# Patient Record
Sex: MALE | Race: WHITE | URBAN - METROPOLITAN AREA
[De-identification: names, ages, dates, MRNs, and addresses within clinical notes are randomized per-mention and may not be internally consistent; named-entity substitution may affect disease eponyms.]

---

## 2020-10-28 ENCOUNTER — TELEPHONE ENCOUNTER (OUTPATIENT)
Dept: URBAN - METROPOLITAN AREA CLINIC 35 | Facility: CLINIC | Age: 50
End: 2020-10-28

## 2020-11-12 ENCOUNTER — OFFICE VISIT (OUTPATIENT)
Dept: URBAN - METROPOLITAN AREA CLINIC 35 | Facility: CLINIC | Age: 50
End: 2020-11-12

## 2020-11-12 NOTE — HPI-MIGRATED HPI
;     Acid Reflux : Patient is a 49-year-old male, who presents today for acid reflux.   Patient denies/admits dysphagia, globus, sour eructations, bloating/gas, indigestion, early satiety, changes in appetite, coughing, nausea, vomiting, abdominal/epigastric pain, or changes in bowel habits.  Patient states that they have/have not had any recent changes in their medications.  He never had EGD.  Last visit (07/02/2019)                 Patient presents today as a new patient to discuss recent onset (about a year and a half ago) of heartburn and reflux. Symptoms include "bumps on the back of his tongue, patient believes that these are inflamed taste buds). Patient states that his dentist saw the bumps and said she also believed they were related to acid reflux. Patient also states that he had an episode where the area in the epigastric region got very distended. He got the generic protonix 40 mg, BID. He took this for 30 days, then stopped. Patient states that his distention and bloating returned. Patient also notes some gas. Patient will occasionally have heartburn. Patient denies dysphagia, globus sensation, sour eructations, indigestion, abdominal pain, nausea, or changes in his/her bowel habits. Patient denies any changes in medications within the last three months. He is unsure of his family history because he is adopted. Patient has never had an EGD before. He has/has  never had a Barium Swallow Test before.  Early satiety has not been present.;

## 2020-11-17 ENCOUNTER — LAB OUTSIDE AN ENCOUNTER (OUTPATIENT)
Dept: URBAN - METROPOLITAN AREA CLINIC 78 | Facility: CLINIC | Age: 50
End: 2020-11-17

## 2020-11-17 ENCOUNTER — OFFICE VISIT (OUTPATIENT)
Dept: URBAN - METROPOLITAN AREA CLINIC 78 | Facility: CLINIC | Age: 50
End: 2020-11-17
Payer: COMMERCIAL

## 2020-11-17 DIAGNOSIS — R19.5 LOOSE BOWEL MOVEMENT: ICD-10-CM

## 2020-11-17 DIAGNOSIS — R14.0 BLOATING: ICD-10-CM

## 2020-11-17 PROCEDURE — 99204 OFFICE O/P NEW MOD 45 MIN: CPT | Performed by: INTERNAL MEDICINE

## 2020-11-17 PROCEDURE — G8482 FLU IMMUNIZE ORDER/ADMIN: HCPCS | Performed by: INTERNAL MEDICINE

## 2020-11-17 PROCEDURE — 99244 OFF/OP CNSLTJ NEW/EST MOD 40: CPT | Performed by: INTERNAL MEDICINE

## 2020-11-17 RX ORDER — ATORVASTATIN CALCIUM 10 MG/1
1 TABLET TABLET, FILM COATED ORAL ONCE A DAY
Status: ACTIVE | COMMUNITY

## 2020-11-17 RX ORDER — SODIUM, POTASSIUM,MAG SULFATES 17.5-3.13G
177 ML SOLUTION, RECONSTITUTED, ORAL ORAL
Qty: 1 PACKAGE | Refills: 0 | OUTPATIENT
Start: 2020-11-17 | End: 2020-11-18

## 2020-11-17 NOTE — HPI-TODAY'S VISIT:
Pt was diagnosed and treated for bladder cancer 4 years ago He was on chemo prophylais x 2 years He has been cancer free x 2 years  Over the last few months he has been having semi soft bms with increased frequency He has been feeling bloated all the time He feels he is more distended than ever He has post prandial discomfort  He tried cutting out dairy - did not help much  He had a nl CT scan of the abdo and pelvis in April 2019 He had a nl colonoscopy in July of 2019  Denies nausea/emesis Denies dysphagia/odynophagia Denies post prandial pain No hematemesis / melena / hematochezia NO constipation No nocturnal symptoms  He chews a lot of gum  No recent travel No new meds No sick contacts No well water Has a dog at home - he is well

## 2020-11-24 LAB
ACCA: 43
ALCA: 89
AMCA: 398
ATYPICAL PANCA: NEGATIVE
COMMENT:: (no result)
COMMENTS: (no result)
DEAMIDATED GLIADIN ABS, IGA: 4
F001-IGE EGG WHITE: <0.1
F002-IGE MILK: <0.1
F003-IGE CODFISH: <0.1
F004-IGE WHEAT: <0.1
F008-IGE CORN: <0.1
F010-IGE SESAME SEED: <0.1
F013-IGE PEANUT: <0.1
F014-IGE SOYBEAN: <0.1
F024-IGE SHRIMP: <0.1
F207-IGE CLAM: <0.1
F256-IGE WALNUT: <0.1
F338-IGE SCALLOP: <0.1
GASCA: 59
GLUCOSE 1: 104
GLUCOSE 2: 127
GLUCOSE 3: 106
GLUCOSE 4: 110
GLUCOSE 5: 98
HEMOGLOBIN A1C: 5.6
IMMUNOGLOBULIN A, QN, SERUM: 209
Lab: (no result)
T-TRANSGLUTAMINASE (TTG) IGA: <2
T4,FREE(DIRECT): 1.09
TSH: 1.58

## 2020-12-04 ENCOUNTER — OFFICE VISIT (OUTPATIENT)
Dept: URBAN - METROPOLITAN AREA CLINIC 78 | Facility: CLINIC | Age: 50
End: 2020-12-04
Payer: COMMERCIAL

## 2020-12-04 DIAGNOSIS — R19.5 LOOSE BOWEL MOVEMENT: ICD-10-CM

## 2020-12-04 DIAGNOSIS — R14.0 BLOATING: ICD-10-CM

## 2020-12-04 PROCEDURE — 99214 OFFICE O/P EST MOD 30 MIN: CPT | Performed by: INTERNAL MEDICINE

## 2020-12-04 PROCEDURE — 3017F COLORECTAL CA SCREEN DOC REV: CPT | Performed by: INTERNAL MEDICINE

## 2020-12-04 PROCEDURE — G8427 DOCREV CUR MEDS BY ELIG CLIN: HCPCS | Performed by: INTERNAL MEDICINE

## 2020-12-04 PROCEDURE — G8420 CALC BMI NORM PARAMETERS: HCPCS | Performed by: INTERNAL MEDICINE

## 2020-12-04 PROCEDURE — G8482 FLU IMMUNIZE ORDER/ADMIN: HCPCS | Performed by: INTERNAL MEDICINE

## 2020-12-04 RX ORDER — SODIUM, POTASSIUM,MAG SULFATES 17.5-3.13G
177 ML SOLUTION, RECONSTITUTED, ORAL ORAL
Qty: 354 ML | Refills: 0 | OUTPATIENT
Start: 2020-12-04 | End: 2020-12-05

## 2020-12-04 RX ORDER — ATORVASTATIN CALCIUM 10 MG/1
1 TABLET TABLET, FILM COATED ORAL ONCE A DAY
Status: ACTIVE | COMMUNITY

## 2020-12-04 NOTE — HPI-TODAY'S VISIT:
Patient is here in f/u recent labs were reviewed IBD markers AMCA, ALCA and ASCA are elevated Food allergy panel is neg Veliac panel is neg TSH is wnl Lactose intolerance test is neg He had a colon in 7/2019 - he had 2 benign polyps   Denies hematochezia, diarrhea Denies abdo pain    ------------------------------------------------------------------------------------- Pt was diagnosed and treated for bladder cancer 4 years ago He was on chemo prophylais x 2 years He has been cancer free x 2 years  Over the last few months he has been having semi soft bms with increased frequency He has been feeling bloated all the time He feels he is more distended than ever He has post prandial discomfort  He tried cutting out dairy - did not help much  He had a nl CT scan of the abdo and pelvis in April 2019 He had a nl colonoscopy in July of 2019  Denies nausea/emesis Denies dysphagia/odynophagia Denies post prandial pain No hematemesis / melena / hematochezia NO constipation No nocturnal symptoms  He chews a lot of gum  No recent travel No new meds No sick contacts No well water Has a dog at home - he is well

## 2020-12-11 ENCOUNTER — OFFICE VISIT (OUTPATIENT)
Dept: URBAN - METROPOLITAN AREA SURGERY CENTER 15 | Facility: SURGERY CENTER | Age: 50
End: 2020-12-11
Payer: COMMERCIAL

## 2020-12-11 DIAGNOSIS — R10.84 ABDOMINAL CRAMPING, GENERALIZED: ICD-10-CM

## 2020-12-11 DIAGNOSIS — K29.80 ACUTE DUODENITIS: ICD-10-CM

## 2020-12-11 DIAGNOSIS — K31.89 ACQUIRED DEFORMITY OF DUODENUM: ICD-10-CM

## 2020-12-11 PROCEDURE — G8907 PT DOC NO EVENTS ON DISCHARG: HCPCS | Performed by: INTERNAL MEDICINE

## 2020-12-11 PROCEDURE — G9937 DIG OR SURV COLSCO: HCPCS | Performed by: INTERNAL MEDICINE

## 2020-12-11 PROCEDURE — 43239 EGD BIOPSY SINGLE/MULTIPLE: CPT | Performed by: INTERNAL MEDICINE

## 2020-12-11 PROCEDURE — 45380 COLONOSCOPY AND BIOPSY: CPT | Performed by: INTERNAL MEDICINE

## 2020-12-29 ENCOUNTER — DASHBOARD ENCOUNTERS (OUTPATIENT)
Age: 50
End: 2020-12-29

## 2021-01-22 ENCOUNTER — OFFICE VISIT (OUTPATIENT)
Dept: URBAN - METROPOLITAN AREA SURGERY CENTER 15 | Facility: SURGERY CENTER | Age: 51
End: 2021-01-22

## 2025-07-28 ENCOUNTER — TELEPHONE ENCOUNTER (OUTPATIENT)
Dept: URBAN - METROPOLITAN AREA CLINIC 6 | Facility: CLINIC | Age: 55
End: 2025-07-28

## 2025-08-19 ENCOUNTER — OFFICE VISIT (OUTPATIENT)
Dept: URBAN - METROPOLITAN AREA SURGERY CENTER 15 | Facility: SURGERY CENTER | Age: 55
End: 2025-08-19

## 2025-08-26 ENCOUNTER — CLAIMS CREATED FROM THE CLAIM WINDOW (OUTPATIENT)
Dept: URBAN - METROPOLITAN AREA SURGERY CENTER 15 | Facility: SURGERY CENTER | Age: 55
End: 2025-08-26

## 2025-08-26 ENCOUNTER — CLAIMS CREATED FROM THE CLAIM WINDOW (OUTPATIENT)
Dept: URBAN - METROPOLITAN AREA CLINIC 4 | Facility: CLINIC | Age: 55
End: 2025-08-26
Payer: COMMERCIAL

## 2025-08-26 DIAGNOSIS — K51.40 INFLAMMATORY POLYPS OF COLON WITHOUT COMPLICATIONS: ICD-10-CM

## 2025-08-26 PROCEDURE — 88305 TISSUE EXAM BY PATHOLOGIST: CPT | Performed by: PATHOLOGY
